# Patient Record
Sex: FEMALE | Race: WHITE | NOT HISPANIC OR LATINO
[De-identification: names, ages, dates, MRNs, and addresses within clinical notes are randomized per-mention and may not be internally consistent; named-entity substitution may affect disease eponyms.]

---

## 2024-01-04 ENCOUNTER — NON-APPOINTMENT (OUTPATIENT)
Age: 24
End: 2024-01-04

## 2024-03-28 ENCOUNTER — NON-APPOINTMENT (OUTPATIENT)
Age: 24
End: 2024-03-28

## 2024-04-01 ENCOUNTER — APPOINTMENT (OUTPATIENT)
Dept: CARDIOLOGY | Facility: CLINIC | Age: 24
End: 2024-04-01
Payer: COMMERCIAL

## 2024-04-01 VITALS — HEART RATE: 72 BPM | DIASTOLIC BLOOD PRESSURE: 68 MMHG | SYSTOLIC BLOOD PRESSURE: 109 MMHG | OXYGEN SATURATION: 97 %

## 2024-04-01 VITALS — BODY MASS INDEX: 36.37 KG/M2 | HEIGHT: 64 IN | WEIGHT: 213 LBS

## 2024-04-01 DIAGNOSIS — Z72.3 LACK OF PHYSICAL EXERCISE: ICD-10-CM

## 2024-04-01 DIAGNOSIS — F41.9 ANXIETY DISORDER, UNSPECIFIED: ICD-10-CM

## 2024-04-01 DIAGNOSIS — Z78.9 OTHER SPECIFIED HEALTH STATUS: ICD-10-CM

## 2024-04-01 DIAGNOSIS — R07.89 OTHER CHEST PAIN: ICD-10-CM

## 2024-04-01 DIAGNOSIS — Z82.49 FAMILY HISTORY OF ISCHEMIC HEART DISEASE AND OTHER DISEASES OF THE CIRCULATORY SYSTEM: ICD-10-CM

## 2024-04-01 DIAGNOSIS — I25.42 CORONARY ARTERY DISSECTION: ICD-10-CM

## 2024-04-01 DIAGNOSIS — F32.A ANXIETY DISORDER, UNSPECIFIED: ICD-10-CM

## 2024-04-01 DIAGNOSIS — K76.0 FATTY (CHANGE OF) LIVER, NOT ELSEWHERE CLASSIFIED: ICD-10-CM

## 2024-04-01 PROBLEM — Z00.00 ENCOUNTER FOR PREVENTIVE HEALTH EXAMINATION: Status: ACTIVE | Noted: 2024-04-01

## 2024-04-01 PROCEDURE — 93000 ELECTROCARDIOGRAM COMPLETE: CPT

## 2024-04-01 PROCEDURE — 99204 OFFICE O/P NEW MOD 45 MIN: CPT | Mod: 25

## 2024-04-01 RX ORDER — DICYCLOMINE HYDROCHLORIDE 20 MG/1
20 TABLET ORAL EVERY 6 HOURS
Refills: 0 | Status: ACTIVE | COMMUNITY

## 2024-04-01 RX ORDER — SERTRALINE HYDROCHLORIDE 100 MG/1
100 TABLET, FILM COATED ORAL DAILY
Refills: 0 | Status: ACTIVE | COMMUNITY

## 2024-04-02 ENCOUNTER — NON-APPOINTMENT (OUTPATIENT)
Age: 24
End: 2024-04-02

## 2024-04-02 PROBLEM — R07.89 ATYPICAL CHEST PAIN: Status: ACTIVE | Noted: 2024-04-02

## 2024-04-02 NOTE — ASSESSMENT
[FreeTextEntry1] : 23-year-old female presents for cardiac evaluation due to extensive family history in first-degree relative with coronary artery disease and history of scad   1) Atypical chest pain with first degree family hx with CAD and SCAD  - patient notes chest pain more or less GI related; but mother has a hx of CAD and SCAD  - EKG reviewed Sinus Rhythm @ 65 bpm with rate variation due to respiration - will refer for TTE to assess LV function and valvulopathy  - refer for US carotids to assess for assessment for tortuosity of the mid to distal ICA  - assess US arterial duplex lower extremities for popliteal aneurysms - will reassess after next visit if any persistence of symptoms will then obtain exercise stress test   Jaz Simon D.O. [facility]/Vascular Cardiology -[facility] Telephone # [phone]

## 2024-04-02 NOTE — REASON FOR VISIT
[Symptom and Test Evaluation] : symptom and test evaluation [FreeTextEntry1] : 23-year-old female presents for cardiac evaluation due to extensive family history in first-degree relative with coronary artery disease and history of scad   Patient notes that mother has a history of CAD status post two-vessel bypass surgery with history of spontaneous coronary artery dissection.  And was told by mother's cardiologist that we will need patient will need cardiovascular workup for this.  CAD s/p CABG x 2 V 7 yrs and SCAD  Patient notes that she randomly gets chest pain which may be due to epigastric pain, GI etiology, notes that the pain is nonexertional at rest with occasional shortness of breath unrelated to the chest pains.  No associated lower extremity edema orthopnea or PND. Patient is being worked up by GI for Crohn's and recently had a colonoscopy with no complications   Smoking: Marijuana occasionally Alcohol: socially

## 2024-04-18 ENCOUNTER — APPOINTMENT (OUTPATIENT)
Dept: CARDIOLOGY | Facility: CLINIC | Age: 24
End: 2024-04-18
Payer: COMMERCIAL

## 2024-04-18 PROCEDURE — 93306 TTE W/DOPPLER COMPLETE: CPT

## 2024-05-02 ENCOUNTER — APPOINTMENT (OUTPATIENT)
Dept: CARDIOLOGY | Facility: CLINIC | Age: 24
End: 2024-05-02
Payer: COMMERCIAL

## 2024-05-02 PROCEDURE — 93880 EXTRACRANIAL BILAT STUDY: CPT

## 2024-05-03 ENCOUNTER — TRANSCRIPTION ENCOUNTER (OUTPATIENT)
Age: 24
End: 2024-05-03

## 2024-05-16 ENCOUNTER — APPOINTMENT (OUTPATIENT)
Dept: CARDIOLOGY | Facility: CLINIC | Age: 24
End: 2024-05-16
Payer: COMMERCIAL

## 2024-05-16 PROCEDURE — 93923 UPR/LXTR ART STDY 3+ LVLS: CPT

## 2024-05-30 ENCOUNTER — APPOINTMENT (OUTPATIENT)
Dept: CARDIOLOGY | Facility: CLINIC | Age: 24
End: 2024-05-30
Payer: COMMERCIAL

## 2024-05-30 PROCEDURE — 93925 LOWER EXTREMITY STUDY: CPT

## 2025-08-25 ENCOUNTER — NON-APPOINTMENT (OUTPATIENT)
Age: 25
End: 2025-08-25

## 2025-08-27 ENCOUNTER — APPOINTMENT (OUTPATIENT)
Dept: OBGYN | Facility: CLINIC | Age: 25
End: 2025-08-27
Payer: COMMERCIAL

## 2025-08-27 VITALS
SYSTOLIC BLOOD PRESSURE: 130 MMHG | BODY MASS INDEX: 36.7 KG/M2 | WEIGHT: 215 LBS | DIASTOLIC BLOOD PRESSURE: 76 MMHG | HEIGHT: 64 IN

## 2025-08-27 DIAGNOSIS — Z01.419 ENCOUNTER FOR GYNECOLOGICAL EXAMINATION (GENERAL) (ROUTINE) W/OUT ABNORMAL FINDINGS: ICD-10-CM

## 2025-08-27 DIAGNOSIS — F12.90 CANNABIS USE, UNSPECIFIED, UNCOMPLICATED: ICD-10-CM

## 2025-08-27 DIAGNOSIS — D64.9 ANEMIA, UNSPECIFIED: ICD-10-CM

## 2025-08-27 DIAGNOSIS — N94.6 DYSMENORRHEA, UNSPECIFIED: ICD-10-CM

## 2025-08-27 PROCEDURE — 99459 PELVIC EXAMINATION: CPT

## 2025-08-27 PROCEDURE — 99385 PREV VISIT NEW AGE 18-39: CPT

## 2025-08-28 LAB
C TRACH RRNA SPEC QL NAA+PROBE: NOT DETECTED
HPV HIGH+LOW RISK DNA PNL CVX: NOT DETECTED
N GONORRHOEA RRNA SPEC QL NAA+PROBE: NOT DETECTED
SOURCE TP AMPLIFICATION: NORMAL

## 2025-09-02 LAB — CYTOLOGY CVX/VAG DOC THIN PREP: NORMAL
